# Patient Record
Sex: FEMALE | Race: WHITE | URBAN - METROPOLITAN AREA
[De-identification: names, ages, dates, MRNs, and addresses within clinical notes are randomized per-mention and may not be internally consistent; named-entity substitution may affect disease eponyms.]

---

## 2019-10-25 ENCOUNTER — TELEPHONE (OUTPATIENT)
Dept: DERMATOLOGY | Facility: CLINIC | Age: 44
End: 2019-10-25

## 2019-10-25 NOTE — TELEPHONE ENCOUNTER
Patient is calling to schedule an appointment related to rash.      Description:  Large/ Small patches of spots on the body   Onset/duration:  Starting last month; denies new medication/ travel  Precip. factors:  N/A  Associated symptoms:  itching    Taking medication(s) as prescribed? No  Taking over the counter medication(s?) Yes, hydrocortisone  Any medication side effects? Not Applicable    Medication(s) improving/managing symptoms?  Yes, cream is helping big patches of red spots    Patient has been scheduled      **LOOK AT HEALTH MAINTENANCE**        Dermatology Pre-visit Call:    Reason for visit : Rash    Any personal history of skin cancers: No    Any family history of skin cancers: Yes,m great paternal aunt    Was the patient referred: No    If the patient was referred, are records obtained: Yes, fax number was provided for [facility]     Has the patient seen a dermatologist in the past: Yes. Records obtained: Yes, fax number provided    Patient Reminders Given:  --Please, make sure you bring an updated list of your medications, allergies and insurance information.  --Plan on being in our facility for approximately one hour, this includes the registration process, office visit, education and check-out process.  If you are having a procedure, more time may be required.     --We are located on the second floor of the building, check in desk D.   --If you need to cancel or reschedule, call [phone].  --We look forward to seeing you in Dermatology Clinic.

## 2019-11-11 ENCOUNTER — TELEPHONE (OUTPATIENT)
Dept: DERMATOLOGY | Facility: CLINIC | Age: 44
End: 2019-11-11

## 2019-11-11 NOTE — TELEPHONE ENCOUNTER
Writer called to confirm that appointment has been canceled; patient has no interest in rescheduling at this time.     Vita Carbajal LPN

## 2022-10-31 ENCOUNTER — LAB REQUISITION (OUTPATIENT)
Dept: LAB | Facility: CLINIC | Age: 47
End: 2022-10-31

## 2022-10-31 DIAGNOSIS — Z13.220 ENCOUNTER FOR SCREENING FOR LIPOID DISORDERS: ICD-10-CM

## 2022-10-31 DIAGNOSIS — Z13.1 ENCOUNTER FOR SCREENING FOR DIABETES MELLITUS: ICD-10-CM

## 2022-10-31 LAB
CHOLEST SERPL-MCNC: 225 MG/DL
HBA1C MFR BLD: 5.3 %
HDLC SERPL-MCNC: 70 MG/DL
LDLC SERPL CALC-MCNC: 149 MG/DL
NONHDLC SERPL-MCNC: 155 MG/DL
TRIGL SERPL-MCNC: 28 MG/DL

## 2022-10-31 PROCEDURE — 83036 HEMOGLOBIN GLYCOSYLATED A1C: CPT | Performed by: OBSTETRICS & GYNECOLOGY

## 2022-10-31 PROCEDURE — 80061 LIPID PANEL: CPT | Performed by: OBSTETRICS & GYNECOLOGY
